# Patient Record
Sex: FEMALE | NOT HISPANIC OR LATINO | Employment: FULL TIME | URBAN - METROPOLITAN AREA
[De-identification: names, ages, dates, MRNs, and addresses within clinical notes are randomized per-mention and may not be internally consistent; named-entity substitution may affect disease eponyms.]

---

## 2017-01-13 ENCOUNTER — LAB (OUTPATIENT)
Dept: LAB | Facility: HOSPITAL | Age: 57
End: 2017-01-13

## 2017-01-13 ENCOUNTER — CONSULT (OUTPATIENT)
Dept: ONCOLOGY | Facility: CLINIC | Age: 57
End: 2017-01-13

## 2017-01-13 VITALS
SYSTOLIC BLOOD PRESSURE: 132 MMHG | DIASTOLIC BLOOD PRESSURE: 80 MMHG | RESPIRATION RATE: 18 BRPM | HEIGHT: 67 IN | WEIGHT: 293 LBS | BODY MASS INDEX: 45.99 KG/M2 | HEART RATE: 86 BPM | TEMPERATURE: 98.4 F

## 2017-01-13 DIAGNOSIS — C50.811 MALIGNANT NEOPLASM OF OVERLAPPING SITES OF RIGHT FEMALE BREAST (HCC): Primary | ICD-10-CM

## 2017-01-13 DIAGNOSIS — Z85.3 HISTORY OF BREAST CANCER: Primary | ICD-10-CM

## 2017-01-13 LAB
BASOPHILS # BLD AUTO: 0.03 10*3/MM3 (ref 0–0.1)
BASOPHILS NFR BLD AUTO: 0.4 % (ref 0–1.1)
DEPRECATED RDW RBC AUTO: 42.7 FL (ref 37–49)
EOSINOPHIL # BLD AUTO: 0.21 10*3/MM3 (ref 0–0.36)
EOSINOPHIL NFR BLD AUTO: 2.6 % (ref 1–5)
ERYTHROCYTE [DISTWIDTH] IN BLOOD BY AUTOMATED COUNT: 13.2 % (ref 11.7–14.5)
HCT VFR BLD AUTO: 43.2 % (ref 34–45)
HGB BLD-MCNC: 14.5 G/DL (ref 11.5–14.9)
HOLD SPECIMEN: NORMAL
IMM GRANULOCYTES # BLD: 0.02 10*3/MM3 (ref 0–0.03)
IMM GRANULOCYTES NFR BLD: 0.2 % (ref 0–0.5)
LYMPHOCYTES # BLD AUTO: 1.38 10*3/MM3 (ref 1–3.5)
LYMPHOCYTES NFR BLD AUTO: 16.8 % (ref 20–49)
MCH RBC QN AUTO: 29.8 PG (ref 27–33)
MCHC RBC AUTO-ENTMCNC: 33.6 G/DL (ref 32–35)
MCV RBC AUTO: 88.9 FL (ref 83–97)
MONOCYTES # BLD AUTO: 0.61 10*3/MM3 (ref 0.25–0.8)
MONOCYTES NFR BLD AUTO: 7.4 % (ref 4–12)
NEUTROPHILS # BLD AUTO: 5.95 10*3/MM3 (ref 1.5–7)
NEUTROPHILS NFR BLD AUTO: 72.6 % (ref 39–75)
NRBC BLD MANUAL-RTO: 0 /100 WBC (ref 0–0)
PLATELET # BLD AUTO: 207 10*3/MM3 (ref 150–375)
PMV BLD AUTO: 9.6 FL (ref 8.9–12.1)
RBC # BLD AUTO: 4.86 10*6/MM3 (ref 3.9–5)
WBC NRBC COR # BLD: 8.2 10*3/MM3 (ref 4–10)
WHOLE BLOOD HOLD SPECIMEN: NORMAL
WHOLE BLOOD HOLD SPECIMEN: NORMAL

## 2017-01-13 PROCEDURE — 36415 COLL VENOUS BLD VENIPUNCTURE: CPT | Performed by: INTERNAL MEDICINE

## 2017-01-13 PROCEDURE — 99243 OFF/OP CNSLTJ NEW/EST LOW 30: CPT | Performed by: INTERNAL MEDICINE

## 2017-01-13 PROCEDURE — 85025 COMPLETE CBC W/AUTO DIFF WBC: CPT | Performed by: INTERNAL MEDICINE

## 2017-01-13 NOTE — PROGRESS NOTES
History:     Reason For Consultation:   1. Stage 1 right breast, multifocal invasive ductal carcinoma of overlapping sites, ER/GA +, Her2 neg diagnosed in 6/2003   * status post bilateral mastectomies, 6 months of adjuvant chemotherapy, and 5 years of Tamoixfen. Genetics eval negative    Referring Provider: Karissa Haines    Records Obtained: Records of the patients history including those obtained from the referring provider were reviewed and summarized in detail.    HPI:   Azalea Cosme presents for consultation of breast cancer. Patient has a history of right sided breast cancer diagnosed after she palpated three separate lesions in 2003. She ultimately was found to have multifocal invasive ductal carcinoma with three separate lesions ranging from 0.5 cm to 2.5 cm that were strongly estrogen receptor positive (90%), progesterone receptor positive (25%), and Her2 sidney negative. She had a bilateral mastectomies with reconstruction followed by 6 months of chemotherapy (uncertain which type) and then 5 years of Tamoxifen. She was premenopausal when diagnosed. She tolerated the Tamoxifen very well. Currently she's doing well without any new troubles. 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 She is working on losing weight, but has been very busy with work and thus finds it difficult to find time to exercise. Afebrile, no nausea, vomiting, new bone pain.    Past Medical   Past Medical History   Diagnosis Date   • Anxiety    • Breast cancer 2003     Carcinoma of right breast, 2003, ductal carcinoma, grade 3, ER/GA positive, HER2/sidney negative, multifocal disease   • Chronic sialoadenitis    • H/O Baker's cyst    • H/O Microalbuminuria    • H/O Right knee pain      Osteoarthritis   • Hypertension    • Morbid obesity    • Obesity    • Osteoarthritis      Right knee   • Plantar fasciitis of left foot      Patient Active Problem List   Diagnosis   • Malignant neoplasm of  overlapping sites of right female breast     Social History   Social History     Social History   • Marital status: Unknown     Spouse name: N/A   • Number of children: 2   • Years of education: N/A     Occupational History   •       Works at a local [employer]     Social History Main Topics   • Smoking status: Never Smoker   • Smokeless tobacco: Not on file   • Alcohol use Yes      Comment: Occasional   • Drug use: No   • Sexual activity: Yes     Partners: Male     Other Topics Concern   • Not on file     Social History Narrative    Moved from [place] to [place] for a new job.     Family History  Family History   Problem Relation Age of Onset   • Ovarian cancer Maternal Grandmother    • Stomach cancer Paternal Grandmother      Medications    Current Outpatient Prescriptions:   •  lisinopril-hydrochlorothiazide (PRINZIDE,ZESTORETIC) 20-12.5 MG per tablet, Take 1 tablet by mouth., Disp: , Rfl:   •  Multiple Vitamin (THERAGRAN PO), Take 1 tablet by mouth daily., Disp: , Rfl:   •  Nutritional Supplements (VITAMIN D BOOSTER PO), Take  by mouth., Disp: , Rfl:   •  Omega-3 Fatty Acids (FISH OIL PO), by Does not apply route., Disp: , Rfl:   •  sertraline (ZOLOFT) 50 MG tablet, Take 50 mg by mouth., Disp: , Rfl:   Allergies  No Known Allergies  Review of Systems  GENERAL: No change in appetite or weight; No fevers, chills, sweats.    SKIN: No nonhealing lesions. No rashes.  HEME/LYMPH: No easy bruising, bleeding. No swollen nodes.   EYES: No vision changes or diplopia.   ENT: No tinnitus, hearing loss, gum bleeding, epistaxis, hoarseness or dysphagia.   RESPIRATORY: No cough, shortness of breath, hemoptysis or wheezing.   CVS: No chest pain, palpitations, orthopnea, dyspnea on exertion or PND.   GI: No melena or hematochezia. No abdominal pain. No nausea, vomiting, constipation, diarrhea  : No lower tract obstructive symptoms, dysuria or hematuria.   MUSCULOSKELETAL: Right knee  "pain  NEUROLOGICAL: No global weakness, loss of consciousness or seizures.   PSYCHIATRIC: No increased nervousness, mood changes or depression.      Objective     Objective:     Vitals:    01/13/17 0807   BP: 132/80   Pulse: 86   Resp: 18   Temp: 98.4 °F (36.9 °C)   Weight: (!) 324 lb 12.8 oz (147 kg)   Height: 66.54\" (169 cm)  Comment: without shoes   PainSc: 0-No pain     Current Status 1/13/2017   ECOG score 0     GENERAL:  Well-developed, well-nourished female in no acute distress.   SKIN:  Warm, dry without rashes, purpura or petechiae.  HEAD:  Normocephalic.  EYES:  Pupils equal, round and reactive to light.  EOMs intact.  Conjunctivae normal.  EARS:  Hearing intact.  NOSE:  Septum midline.  No excoriations or nasal discharge.  MOUTH:  Tongue is well-papillated; no stomatitis or ulcers.  Lips normal.  THROAT:  Oropharynx without lesions or exudates.  NECK:  Supple with good range of motion; no thyromegaly or masses  LYMPHATICS:  No cervical, supraclavicular, axillary adenopathy.  CHEST:  Lungs clear to percussion and auscultation. Good airflow. Breasts are status post bilateral mastectomies with reconstruction. No palpable masses, skin changes.   CARDIAC:  Regular rate and rhythm without murmurs, rubs or gallops. Normal S1,S2.  ABDOMEN:  Soft, nontender, no hepatosplenomegaly, normal bowel sounds. Obese.   EXTREMITIES:  No clubbing, cyanosis or edema.  NEUROLOGICAL:  Cranial Nerves II-XII grossly intact.  No focal neurological deficits.  PSYCHIATRIC:  Normal affect and mood.     Labs and Imaging  Results for orders placed or performed in visit on 01/13/17   CBC Auto Differential   Result Value Ref Range    WBC 8.20 4.00 - 10.00 10*3/mm3    RBC 4.86 3.90 - 5.00 10*6/mm3    Hemoglobin 14.5 11.5 - 14.9 g/dL    Hematocrit 43.2 34.0 - 45.0 %    MCV 88.9 83.0 - 97.0 fL    MCH 29.8 27.0 - 33.0 pg    MCHC 33.6 32.0 - 35.0 g/dL    RDW 13.2 11.7 - 14.5 %    RDW-SD 42.7 37.0 - 49.0 fl    MPV 9.6 8.9 - 12.1 fL    " Platelets 207 150 - 375 10*3/mm3    Neutrophil % 72.6 39.0 - 75.0 %    Lymphocyte % 16.8 (L) 20.0 - 49.0 %    Monocyte % 7.4 4.0 - 12.0 %    Eosinophil % 2.6 1.0 - 5.0 %    Basophil % 0.4 0.0 - 1.1 %    Immature Grans % 0.2 0.0 - 0.5 %    Neutrophils, Absolute 5.95 1.50 - 7.00 10*3/mm3    Lymphocytes, Absolute 1.38 1.00 - 3.50 10*3/mm3    Monocytes, Absolute 0.61 0.25 - 0.80 10*3/mm3    Eosinophils, Absolute 0.21 0.00 - 0.36 10*3/mm3    Basophils, Absolute 0.03 0.00 - 0.10 10*3/mm3    Immature Grans, Absolute 0.02 0.00 - 0.03 10*3/mm3    nRBC 0.0 0.0 - 0.0 /100 WBC       Assessment/Plan   Assessment/Plan:   1. Stage 1 right breast, multifocal invasive ductal carcinoma of overlapping sites, ER/PA +, Her2 neg diagnosed in 6/2003   * status post bilateral mastectomies, 6 months of adjuvant chemotherapy, and 5 years of Tamoixfen. Genetics eval negative   * Currently [place]. Doing well.    * Patient was instructed on the importance of physical activity, healthy diet, and chest wall exams.      Follow-up in 12 months. I asked the patient to call for any questions, concerns, or new symptoms.